# Patient Record
Sex: FEMALE | Race: ASIAN | NOT HISPANIC OR LATINO | URBAN - METROPOLITAN AREA
[De-identification: names, ages, dates, MRNs, and addresses within clinical notes are randomized per-mention and may not be internally consistent; named-entity substitution may affect disease eponyms.]

---

## 2022-06-17 ENCOUNTER — TELEPHONE (OUTPATIENT)
Dept: SCHEDULING | Facility: IMAGING CENTER | Age: 67
End: 2022-06-17

## 2022-06-21 ENCOUNTER — OFFICE VISIT (OUTPATIENT)
Dept: MEDICAL GROUP | Facility: PHYSICIAN GROUP | Age: 67
End: 2022-06-21
Payer: MEDICARE

## 2022-06-21 VITALS
BODY MASS INDEX: 23.92 KG/M2 | WEIGHT: 148.81 LBS | RESPIRATION RATE: 14 BRPM | DIASTOLIC BLOOD PRESSURE: 72 MMHG | HEART RATE: 96 BPM | HEIGHT: 66 IN | SYSTOLIC BLOOD PRESSURE: 132 MMHG | OXYGEN SATURATION: 94 % | TEMPERATURE: 97.7 F

## 2022-06-21 DIAGNOSIS — R41.844 FRONTAL LOBE AND EXECUTIVE FUNCTION DEFICIT: ICD-10-CM

## 2022-06-21 DIAGNOSIS — E11.9 TYPE 2 DIABETES MELLITUS WITHOUT COMPLICATION, WITHOUT LONG-TERM CURRENT USE OF INSULIN (HCC): ICD-10-CM

## 2022-06-21 DIAGNOSIS — Z00.00 HEALTHCARE MAINTENANCE: ICD-10-CM

## 2022-06-21 DIAGNOSIS — G31.01 PRIMARY PROGRESSIVE APHASIA (HCC): ICD-10-CM

## 2022-06-21 DIAGNOSIS — F02.80 PRIMARY PROGRESSIVE APHASIA (HCC): ICD-10-CM

## 2022-06-21 PROCEDURE — 99203 OFFICE O/P NEW LOW 30 MIN: CPT | Performed by: STUDENT IN AN ORGANIZED HEALTH CARE EDUCATION/TRAINING PROGRAM

## 2022-06-21 RX ORDER — ATORVASTATIN CALCIUM 80 MG/1
80 TABLET, FILM COATED ORAL EVERY EVENING
Qty: 90 TABLET | Refills: 1 | Status: SHIPPED | OUTPATIENT
Start: 2022-06-21 | End: 2022-08-24 | Stop reason: SDUPTHER

## 2022-06-21 RX ORDER — LORATADINE 10 MG/1
10 TABLET ORAL DAILY
Qty: 90 TABLET | Refills: 1 | Status: SHIPPED | OUTPATIENT
Start: 2022-06-21 | End: 2022-09-27 | Stop reason: SDUPTHER

## 2022-06-21 RX ORDER — LISINOPRIL 40 MG/1
40 TABLET ORAL DAILY
Qty: 90 TABLET | Refills: 1 | Status: SHIPPED | OUTPATIENT
Start: 2022-06-21 | End: 2022-08-24 | Stop reason: SDUPTHER

## 2022-06-21 RX ORDER — MONTELUKAST SODIUM 10 MG/1
10 TABLET ORAL DAILY
Qty: 90 TABLET | Refills: 0 | Status: SHIPPED | OUTPATIENT
Start: 2022-06-21 | End: 2022-06-28

## 2022-06-21 RX ORDER — LISINOPRIL 40 MG/1
40 TABLET ORAL DAILY
COMMUNITY
End: 2022-06-21 | Stop reason: SDUPTHER

## 2022-06-21 RX ORDER — ATORVASTATIN CALCIUM 80 MG/1
80 TABLET, FILM COATED ORAL NIGHTLY
COMMUNITY
End: 2022-06-21 | Stop reason: SDUPTHER

## 2022-06-21 RX ORDER — OXYBUTYNIN CHLORIDE 5 MG/1
5 TABLET ORAL 3 TIMES DAILY
COMMUNITY
End: 2022-06-21 | Stop reason: SDUPTHER

## 2022-06-21 RX ORDER — PIOGLITAZONEHYDROCHLORIDE 30 MG/1
30 TABLET ORAL DAILY
COMMUNITY
End: 2022-06-21

## 2022-06-21 RX ORDER — PANTOPRAZOLE SODIUM 40 MG/1
40 TABLET, DELAYED RELEASE ORAL DAILY
Qty: 90 TABLET | Refills: 1 | Status: SHIPPED | OUTPATIENT
Start: 2022-06-21 | End: 2022-08-24 | Stop reason: SDUPTHER

## 2022-06-21 RX ORDER — MONTELUKAST SODIUM 10 MG/1
10 TABLET ORAL DAILY
COMMUNITY
End: 2022-06-21 | Stop reason: SDUPTHER

## 2022-06-21 RX ORDER — PANTOPRAZOLE SODIUM 40 MG/1
40 TABLET, DELAYED RELEASE ORAL DAILY
COMMUNITY
End: 2022-06-21 | Stop reason: SDUPTHER

## 2022-06-21 RX ORDER — LORATADINE 10 MG/1
10 TABLET ORAL DAILY
COMMUNITY
End: 2022-06-21 | Stop reason: SDUPTHER

## 2022-06-21 RX ORDER — OXYBUTYNIN CHLORIDE 5 MG/1
5 TABLET ORAL 3 TIMES DAILY
Qty: 120 TABLET | Refills: 2 | Status: SHIPPED | OUTPATIENT
Start: 2022-06-21 | End: 2022-08-24 | Stop reason: SDUPTHER

## 2022-06-21 NOTE — ASSESSMENT & PLAN NOTE
Patient has primary progressive aphasia, was seeing a neurologist team in [facility].  I am requesting records from her previous primary care provider.  As far as treatment there is limited options per the  however I think it would be a good idea to have neurology follow-up at least once a year to help manage complications as her disease progresses.

## 2022-06-21 NOTE — PROGRESS NOTES
HISTORY OF PRESENT ILLNESS: Roxane is a pleasant 67 y.o. female, established patient who presents today to discuss medical problems as listed below:    History was difficult to establish, patient is nonverbal most of it taken through the  and with using patient's device.    Problem   Type 2 Diabetes Mellitus Without Complication, Without Long-Term Current Use of Insulin (Hcc)    Reaction to metformin due to loss of appetite and sense of taste. She was put on actos but this is making her urinate.      Frontal Lobe and Executive Function Deficit    Patient nonverbal. She was fine until a few years. Primary progressive aphasia. Rare condition, was seeing [facility] for specialist. Reportedly nothing to do for this disease.      Healthcare Maintenance   Primary Progressive Aphasia (Hcc)        Current Outpatient Medications Ordered in Epic   Medication Sig Dispense Refill   • atorvastatin (LIPITOR) 80 MG tablet Take 1 Tablet by mouth every evening for 90 days. 90 Tablet 1   • pantoprazole (PROTONIX) 40 MG Tablet Delayed Response Take 1 Tablet by mouth every day for 90 days. 90 Tablet 1   • lisinopril (PRINIVIL) 40 MG tablet Take 1 Tablet by mouth every day for 90 days. 90 Tablet 1   • oxybutynin (DITROPAN) 5 MG Tab Take 1 Tablet by mouth 3 times a day. 120 Tablet 2   • montelukast (SINGULAIR) 10 MG Tab Take 1 Tablet by mouth every day. 90 Tablet 0   • loratadine (CLARITIN) 10 MG Tab Take 1 Tablet by mouth every day. 90 Tablet 1     No current Epic-ordered facility-administered medications on file.       History reviewed. No pertinent past medical history.  History reviewed. No pertinent surgical history.  Social History     Tobacco Use   • Smoking status: Former Smoker     Quit date: 2009     Years since quittin.0   • Smokeless tobacco: Never Used   Vaping Use   • Vaping Use: Never used   Substance Use Topics   • Alcohol use: Never   • Drug use: Never      History reviewed. No pertinent family  "history.  Current Outpatient Medications   Medication Sig Dispense Refill   • atorvastatin (LIPITOR) 80 MG tablet Take 1 Tablet by mouth every evening for 90 days. 90 Tablet 1   • pantoprazole (PROTONIX) 40 MG Tablet Delayed Response Take 1 Tablet by mouth every day for 90 days. 90 Tablet 1   • lisinopril (PRINIVIL) 40 MG tablet Take 1 Tablet by mouth every day for 90 days. 90 Tablet 1   • oxybutynin (DITROPAN) 5 MG Tab Take 1 Tablet by mouth 3 times a day. 120 Tablet 2   • montelukast (SINGULAIR) 10 MG Tab Take 1 Tablet by mouth every day. 90 Tablet 0   • loratadine (CLARITIN) 10 MG Tab Take 1 Tablet by mouth every day. 90 Tablet 1     No current facility-administered medications for this visit.       Allergies:  Not on File    Allergies, past medical history, past surgical history, family history, social history reviewed and updated.    Objective:    /72   Pulse 96   Temp 36.5 °C (97.7 °F) (Temporal)   Resp 14   Ht 1.676 m (5' 6\")   Wt 67.5 kg (148 lb 13 oz)   SpO2 94%   BMI 24.02 kg/m²    Body mass index is 24.02 kg/m².    Physical Exam  Vitals reviewed.   Constitutional:       General: She is not in acute distress.     Appearance: Normal appearance. She is not ill-appearing or toxic-appearing.   HENT:      Head: Normocephalic and atraumatic.   Eyes:      General: No scleral icterus.        Right eye: No discharge.         Left eye: No discharge.      Conjunctiva/sclera: Conjunctivae normal.   Neck:      Vascular: No carotid bruit.   Cardiovascular:      Rate and Rhythm: Normal rate and regular rhythm.      Pulses: Normal pulses.      Heart sounds: Normal heart sounds. No murmur heard.  Pulmonary:      Effort: Pulmonary effort is normal. No respiratory distress.      Breath sounds: Normal breath sounds. No wheezing or rales.   Abdominal:      General: Abdomen is flat. Bowel sounds are normal. There is no distension.      Palpations: Abdomen is soft. There is no mass.      Tenderness: There is no " abdominal tenderness. There is no guarding or rebound.   Musculoskeletal:      Cervical back: Neck supple.      Right lower leg: No edema.      Left lower leg: No edema.   Skin:     General: Skin is warm and dry.   Neurological:      General: No focal deficit present.      Mental Status: She is alert.   Psychiatric:         Mood and Affect: Mood normal.         Thought Content: Thought content normal.          Assessment/Plan:    Problem List Items Addressed This Visit     Type 2 diabetes mellitus without complication, without long-term current use of insulin (HCC)     Patient reports that she is not tolerant to metformin and is having a side effect with of Actos of increased urination.  Do not believe this is a common side effect of this medication.    Plan: Discontinue medications for now, will request recent lab work which was done a few weeks ago.  She will also write down her blood sugars daily and return to care in 1 week where we can finish establishing care and decide on further diabetic management.           Frontal lobe and executive function deficit    Relevant Orders    Referral to Neurology    Healthcare maintenance     Breast cancer: no known fam hx of breast or ovarian cancer but little to no relationship with family.   Mammograms: have been normal, last one was within last year    Colonoscopy:  Will request records from primary care office.    Pap smear:  Will request records    Immunizations: had both covid vaccinations, no booster.           Primary progressive aphasia (HCC)     Patient has primary progressive aphasia, was seeing a neurologist team in [facility].  I am requesting records from her previous primary care provider.  As far as treatment there is limited options per the  however I think it would be a good idea to have neurology follow-up at least once a year to help manage complications as her disease progresses.           Relevant Orders    Referral to Neurology           Return in  about 10 days (around 7/1/2022).

## 2022-06-21 NOTE — ASSESSMENT & PLAN NOTE
Breast cancer: no known fam hx of breast or ovarian cancer but little to no relationship with family.   Mammograms: have been normal, last one was within last year    Colonoscopy:  Will request records from primary care office.    Pap smear:  Will request records    Immunizations: had both covid vaccinations, no booster.

## 2022-06-21 NOTE — LETTER
[employer]  Mili Fair D.O.  [address]  [place] [address]  Fax: [phone]   Authorization for Release/Disclosure of   Protected Health Information   Name: GIANNA BORJA : 1955 SSN: [id number]   Address: [address] [place] [address] [address] [place] [address] Phone:    There are no phone numbers on file.   I authorize the entity listed below to release/disclose the PHI below to:   [facility]/Mili Fair D.O. and Mili Fair D.O.   Provider or Entity Name:  Dr. Rupesh Mckee, [place]  Fx [phone]     Address   City, State, [facility]   Phone:      Fax:     Reason for request: continuity of care   Information to be released:    [x  ] LAST COLONOSCOPY,  including any PATH REPORT and follow-up  [  ] LAST FIT/COLOGUARD RESULT [ x ] LAST DEXA  [ x ] LAST MAMMOGRAM  [x  ] LAST PAP  [x  ] LAST LABS [  ] RETINA EXAM REPORT  [x  ] IMMUNIZATION RECORDS  [ x ] Release all info      [  ] Check here and initial the line next to each item to release ALL health information INCLUDING  _____ Care and treatment for drug and / or alcohol abuse  _____ HIV testing, infection status, or AIDS  _____ Genetic Testing    DATES OF SERVICE OR TIME PERIOD TO BE DISCLOSED: _____________  I understand and acknowledge that:  * This Authorization may be revoked at any time by you in writing, except if your health information has already been used or disclosed.  * Your health information that will be used or disclosed as a result of you signing this authorization could be re-disclosed by the recipient. If this occurs, your re-disclosed health information may no longer be protected by State or Federal laws.  * You may refuse to sign this Authorization. Your refusal will not affect your ability to obtain treatment.  * This Authorization becomes effective upon signing and will  on (date) __________.      If no date is indicated, this Authorization will  one (1) year from the signature date.    Name: Gianna  Álvaro    Signature:   Date:     6/21/2022       PLEASE FAX REQUESTED RECORDS BACK TO: [phone]

## 2022-06-21 NOTE — ASSESSMENT & PLAN NOTE
Patient reports that she is not tolerant to metformin and is having a side effect with of Actos of increased urination.  Do not believe this is a common side effect of this medication.    Plan: Discontinue medications for now, will request recent lab work which was done a few weeks ago.  She will also write down her blood sugars daily and return to care in 1 week where we can finish establishing care and decide on further diabetic management.

## 2022-06-28 ENCOUNTER — OFFICE VISIT (OUTPATIENT)
Dept: MEDICAL GROUP | Facility: PHYSICIAN GROUP | Age: 67
End: 2022-06-28
Payer: MEDICARE

## 2022-06-28 VITALS
WEIGHT: 149.69 LBS | HEART RATE: 98 BPM | BODY MASS INDEX: 24.06 KG/M2 | SYSTOLIC BLOOD PRESSURE: 130 MMHG | HEIGHT: 66 IN | DIASTOLIC BLOOD PRESSURE: 86 MMHG | TEMPERATURE: 98.9 F | RESPIRATION RATE: 14 BRPM | OXYGEN SATURATION: 94 %

## 2022-06-28 DIAGNOSIS — Z00.00 HEALTHCARE MAINTENANCE: ICD-10-CM

## 2022-06-28 DIAGNOSIS — Z23 NEED FOR VACCINATION: ICD-10-CM

## 2022-06-28 DIAGNOSIS — N32.81 OVERACTIVE BLADDER: ICD-10-CM

## 2022-06-28 DIAGNOSIS — E11.9 TYPE 2 DIABETES MELLITUS WITHOUT COMPLICATION, WITHOUT LONG-TERM CURRENT USE OF INSULIN (HCC): ICD-10-CM

## 2022-06-28 LAB
HBA1C MFR BLD: 6.3 % (ref 0–5.6)
INT CON NEG: NEGATIVE
INT CON POS: POSITIVE

## 2022-06-28 PROCEDURE — 83036 HEMOGLOBIN GLYCOSYLATED A1C: CPT | Performed by: STUDENT IN AN ORGANIZED HEALTH CARE EDUCATION/TRAINING PROGRAM

## 2022-06-28 PROCEDURE — 99213 OFFICE O/P EST LOW 20 MIN: CPT | Mod: 25 | Performed by: STUDENT IN AN ORGANIZED HEALTH CARE EDUCATION/TRAINING PROGRAM

## 2022-06-28 PROCEDURE — 90677 PCV20 VACCINE IM: CPT | Performed by: STUDENT IN AN ORGANIZED HEALTH CARE EDUCATION/TRAINING PROGRAM

## 2022-06-28 PROCEDURE — G0009 ADMIN PNEUMOCOCCAL VACCINE: HCPCS | Performed by: STUDENT IN AN ORGANIZED HEALTH CARE EDUCATION/TRAINING PROGRAM

## 2022-06-28 NOTE — PROGRESS NOTES
HISTORY OF PRESENT ILLNESS: Roxane is a pleasant 67 y.o. female, established patient who presents today to discuss medical problems as listed below:    Problem   Overactive Bladder    Takes oxybutynin     Type 2 Diabetes Mellitus Without Complication, Without Long-Term Current Use of Insulin (Hcc)    Patient is here to finish up following up establishing care.  She has an progressive aphasia disorder that makes history taking very difficult and time-consuming.  She reports that she has had reactions from metformin, Jardiance, Actos and would not like to be on these medications anymore for her diabetes.  She has been checking her sugars and they were running about 150s a.m. fasted.  Denies any chest pain, dyspnea exertion, nausea, vomiting, abdominal pain, edema.     Healthcare Maintenance        Current Outpatient Medications Ordered in Epic   Medication Sig Dispense Refill   • SITagliptin (JANUVIA) 100 MG Tab Take 1 Tablet by mouth every day. 30 Tablet 3   • atorvastatin (LIPITOR) 80 MG tablet Take 1 Tablet by mouth every evening for 90 days. 90 Tablet 1   • pantoprazole (PROTONIX) 40 MG Tablet Delayed Response Take 1 Tablet by mouth every day for 90 days. 90 Tablet 1   • lisinopril (PRINIVIL) 40 MG tablet Take 1 Tablet by mouth every day for 90 days. 90 Tablet 1   • oxybutynin (DITROPAN) 5 MG Tab Take 1 Tablet by mouth 3 times a day. 120 Tablet 2   • loratadine (CLARITIN) 10 MG Tab Take 1 Tablet by mouth every day. 90 Tablet 1     No current Epic-ordered facility-administered medications on file.       ROS     No past medical history on file.  No past surgical history on file.  Social History     Tobacco Use   • Smoking status: Former Smoker     Quit date: 2009     Years since quittin.0   • Smokeless tobacco: Never Used   Vaping Use   • Vaping Use: Never used   Substance Use Topics   • Alcohol use: Never   • Drug use: Never      No family history on file.  Current Outpatient Medications   Medication  "Sig Dispense Refill   • SITagliptin (JANUVIA) 100 MG Tab Take 1 Tablet by mouth every day. 30 Tablet 3   • atorvastatin (LIPITOR) 80 MG tablet Take 1 Tablet by mouth every evening for 90 days. 90 Tablet 1   • pantoprazole (PROTONIX) 40 MG Tablet Delayed Response Take 1 Tablet by mouth every day for 90 days. 90 Tablet 1   • lisinopril (PRINIVIL) 40 MG tablet Take 1 Tablet by mouth every day for 90 days. 90 Tablet 1   • oxybutynin (DITROPAN) 5 MG Tab Take 1 Tablet by mouth 3 times a day. 120 Tablet 2   • loratadine (CLARITIN) 10 MG Tab Take 1 Tablet by mouth every day. 90 Tablet 1     No current facility-administered medications for this visit.       Allergies:  No Known Allergies    Allergies, past medical history, past surgical history, family history, social history reviewed and updated.    Objective:    /86   Pulse 98   Temp 37.2 °C (98.9 °F) (Temporal)   Resp 14   Ht 1.676 m (5' 6\")   Wt 67.9 kg (149 lb 11.1 oz)   SpO2 94%   BMI 24.16 kg/m²    Body mass index is 24.16 kg/m².    Physical Exam  Vitals reviewed.   Constitutional:       General: She is not in acute distress.     Appearance: Normal appearance. She is not ill-appearing or toxic-appearing.   HENT:      Head: Normocephalic and atraumatic.   Eyes:      General: No scleral icterus.        Right eye: No discharge.         Left eye: No discharge.      Conjunctiva/sclera: Conjunctivae normal.   Neck:      Vascular: No carotid bruit.   Cardiovascular:      Rate and Rhythm: Normal rate and regular rhythm.      Pulses: Normal pulses.      Heart sounds: Normal heart sounds. No murmur heard.  Pulmonary:      Effort: Pulmonary effort is normal. No respiratory distress.      Breath sounds: Normal breath sounds. No wheezing or rales.   Abdominal:      General: Abdomen is flat. Bowel sounds are normal. There is no distension.      Palpations: Abdomen is soft. There is no mass.      Tenderness: There is no abdominal tenderness. There is no guarding or " rebound.   Musculoskeletal:      Cervical back: Neck supple.      Right lower leg: No edema.      Left lower leg: No edema.   Skin:     General: Skin is warm and dry.   Neurological:      General: No focal deficit present.      Mental Status: She is alert.   Psychiatric:         Mood and Affect: Mood normal.         Thought Content: Thought content normal.          Assessment/Plan:    Problem List Items Addressed This Visit     Type 2 diabetes mellitus without complication, without long-term current use of insulin (HCC)     Patient is not tolerant to most of diabetic medications.  She has tried metformin, Actos, Jardiance in the past.  We will start Januvia 100 mg daily.  A1c today 6.3, chronic condition, well-controlled.  Follow-up 1 month to see how she is tolerating this medication as well as diabetic foot exam.  Pneumonia vaccine today.           Relevant Medications    SITagliptin (JANUVIA) 100 MG Tab    Other Relevant Orders    POCT  A1C (Completed)    Lipid Profile    Microalbumin Creat Ratio Urine (Lab Collect)    Comp Metabolic Panel    CBC WITHOUT DIFFERENTIAL    Healthcare maintenance     Breast cancer: no known fam hx of breast or ovarian cancer but little to no relationship with family.   Mammograms: have been normal, last one was within last year, patient reports she would not like to get another one done this year.     Colonoscopy:  Will request records from primary care office.     Pap smear:  Will request records     Immunizations: had both covid vaccinations, no booster.               Overactive bladder      Other Visit Diagnoses     Need for vaccination        Relevant Orders    Pneumococcal Conjugate Vaccine 20-Valent (19 yrs+) (Completed)      1 chronic condition, well-controlled with medication management.    Return in about 4 weeks (around 7/26/2022) for diabetes.

## 2022-06-28 NOTE — ASSESSMENT & PLAN NOTE
Breast cancer: no known fam hx of breast or ovarian cancer but little to no relationship with family.   Mammograms: have been normal, last one was within last year, patient reports she would not like to get another one done this year.     Colonoscopy:  Will request records from primary care office.     Pap smear:  Will request records     Immunizations: had both covid vaccinations, no booster.

## 2022-06-28 NOTE — ASSESSMENT & PLAN NOTE
Patient is not tolerant to most of diabetic medications.  She has tried metformin, Actos, Jardiance in the past.  We will start Januvia 100 mg daily.  A1c today 6.3, chronic condition, well-controlled.  Follow-up 1 month to see how she is tolerating this medication as well as diabetic foot exam.  Pneumonia vaccine today.

## 2022-07-28 ENCOUNTER — TELEPHONE (OUTPATIENT)
Dept: HEALTH INFORMATION MANAGEMENT | Facility: OTHER | Age: 67
End: 2022-07-28
Payer: MEDICARE

## 2022-07-28 NOTE — TELEPHONE ENCOUNTER
Outcome: Talked to  since patient can't speak, they would not like to r/s at the moment.    Please transfer to Patient Outreach Team at [phone] when patient returns call.    Attempt # 1    -CS

## 2022-08-24 ENCOUNTER — OFFICE VISIT (OUTPATIENT)
Dept: INTERNAL MEDICINE | Facility: OTHER | Age: 67
End: 2022-08-24
Payer: MEDICARE

## 2022-08-24 VITALS
DIASTOLIC BLOOD PRESSURE: 88 MMHG | HEIGHT: 66 IN | SYSTOLIC BLOOD PRESSURE: 131 MMHG | WEIGHT: 143.2 LBS | OXYGEN SATURATION: 96 % | TEMPERATURE: 97.7 F | HEART RATE: 90 BPM | BODY MASS INDEX: 23.01 KG/M2

## 2022-08-24 DIAGNOSIS — G31.01 PRIMARY PROGRESSIVE APHASIA (HCC): ICD-10-CM

## 2022-08-24 DIAGNOSIS — N32.81 OVERACTIVE BLADDER: ICD-10-CM

## 2022-08-24 DIAGNOSIS — K21.9 GASTROESOPHAGEAL REFLUX DISEASE WITHOUT ESOPHAGITIS: ICD-10-CM

## 2022-08-24 DIAGNOSIS — F02.80 PRIMARY PROGRESSIVE APHASIA (HCC): ICD-10-CM

## 2022-08-24 DIAGNOSIS — J30.2 SEASONAL ALLERGIES: ICD-10-CM

## 2022-08-24 DIAGNOSIS — E11.9 TYPE 2 DIABETES MELLITUS WITHOUT COMPLICATION, WITHOUT LONG-TERM CURRENT USE OF INSULIN (HCC): ICD-10-CM

## 2022-08-24 DIAGNOSIS — R41.844 FRONTAL LOBE AND EXECUTIVE FUNCTION DEFICIT: ICD-10-CM

## 2022-08-24 PROCEDURE — 99204 OFFICE O/P NEW MOD 45 MIN: CPT | Mod: GC | Performed by: STUDENT IN AN ORGANIZED HEALTH CARE EDUCATION/TRAINING PROGRAM

## 2022-08-24 RX ORDER — OXYBUTYNIN CHLORIDE 5 MG/1
5 TABLET ORAL 3 TIMES DAILY
Qty: 90 TABLET | Refills: 2 | Status: SHIPPED | OUTPATIENT
Start: 2022-08-24 | End: 2022-09-27

## 2022-08-24 RX ORDER — PIOGLITAZONEHYDROCHLORIDE 30 MG/1
30 TABLET ORAL DAILY
COMMUNITY
End: 2022-08-24

## 2022-08-24 RX ORDER — ATORVASTATIN CALCIUM 80 MG/1
80 TABLET, FILM COATED ORAL EVERY EVENING
Qty: 90 TABLET | Refills: 1 | Status: SHIPPED | OUTPATIENT
Start: 2022-08-24 | End: 2022-09-27 | Stop reason: SDUPTHER

## 2022-08-24 RX ORDER — LISINOPRIL 40 MG/1
40 TABLET ORAL DAILY
Qty: 90 TABLET | Refills: 1 | Status: SHIPPED | OUTPATIENT
Start: 2022-08-24 | End: 2022-09-27 | Stop reason: SDUPTHER

## 2022-08-24 RX ORDER — FLUTICASONE PROPIONATE 50 MCG
1 SPRAY, SUSPENSION (ML) NASAL DAILY
Qty: 16 G | Refills: 1 | Status: SHIPPED | OUTPATIENT
Start: 2022-08-24 | End: 2022-09-27 | Stop reason: SDUPTHER

## 2022-08-24 RX ORDER — VITS A,C,E/LUTEIN/MINERALS 300MCG-200
1 TABLET ORAL DAILY
COMMUNITY

## 2022-08-24 RX ORDER — PANTOPRAZOLE SODIUM 40 MG/1
40 TABLET, DELAYED RELEASE ORAL DAILY
Qty: 90 TABLET | Refills: 1 | Status: SHIPPED | OUTPATIENT
Start: 2022-08-24 | End: 2022-09-27 | Stop reason: SDUPTHER

## 2022-08-24 RX ORDER — MULTIVIT WITH MINERALS/LUTEIN
TABLET ORAL
COMMUNITY

## 2022-08-24 ASSESSMENT — PATIENT HEALTH QUESTIONNAIRE - PHQ9: CLINICAL INTERPRETATION OF PHQ2 SCORE: 0

## 2022-08-24 NOTE — PROGRESS NOTES
New Patient to Establish    Reason to establish: New patient to establish    CC:   Chief Complaint   Patient presents with    New Patient    Epistaxis    Cough    Loss of Appetite    Medication Refill    Diabetes       HPI:     Patient is a pleasant 68 y/o F with a PMH significant for primary progressive aphasia, type 2 diabetes mellitus without use of insulin (A1c 6.3% in 6/2022), GERD, seasonal allergies, overactive bladder here for the issues listed below as well as to establish care.  She is here with her , Yovani Mccoy.  She moved from [place] to [place] on June 12, 2022.  History is provided by both the patient and her .    Note: I have independently reviewed all records that the patient has brought from [facility].    Epistaxis: She has been having bloody nose in the morning and clear mucus.  It has been happening every day. They have been using a small humidifier for a few weeks. Patient has been coughing frequently in the AM and in the PM, dry cough. She states she cannot taste food and has a loss of appetite because of this.    Diabetes: Took metformin for several years, and she went to a diabetic specialist in [place] who was told that it was due to metformin. She was changed to pioglitazone and appetite came back gradually but now she has reduced appetite still.  She has also tried Jardiance and Januvia in the past but these have been stopped because of the side effects (she felt that they were too strong). A1c was 6.3% in 6/2022. She does not have any symptoms of dizziness or falls. Blood glucose levels are usually around 150. She eats a lot of vegetables though eats a lot of noodles and rice, as well as potstickers. She does not want to change her diet.    Urinary incontinence: She has been taking oxybutynin 5 mg for quite a long time which has stopped her urinary incontinence. No urinary frequency, urgency, no dysuria, though she has dry mouth as a side effect.    Primary  progressive aphasia: Started about 5 years ago. Thought at the beginning it was a hearing loss; however, she has gotten extensive workup at [facility].  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 There is no cure for this though she is taking many chinese herbs to try to help this. She can hear things though it sounds garbled (hears noise). She cannot speak. Hearing aids do not help (makes it worse). She can read Chinese and can write simplified Chinese; she uses this as well as a cell phone text  in order to communicate with her . She hears and enjoys music previously though now it is getting more difficitult. It is unknown whether in the future her comprehension will get worsen. She previously got a referral to [facility] but would like my opinion on who to go to. Her  needs a power of  due to issues with her neurological issues; per , patient will likely need a note/evaluation from a neurologist for further evaluation of this.    She needs refills of all of her medications.    Patient Active Problem List    Diagnosis Date Noted    Gastroesophageal reflux disease without esophagitis 08/24/2022    Overactive bladder 06/28/2022    Type 2 diabetes mellitus without complication, without long-term current use of insulin (HCC) 06/21/2022    Frontal lobe and executive function deficit 06/21/2022    Healthcare maintenance 06/21/2022    Primary progressive aphasia (HCC) 06/21/2022       History reviewed. No pertinent past medical history.    Current Outpatient Medications   Medication Sig Dispense Refill    Ascorbic Acid (VITAMIN C) 1000 MG Tab Take  by mouth.      Multiple Vitamins-Minerals (OCUVITE-LUTEIN) Tab Take 1 Tablet by mouth every day.      fluticasone (FLONASE) 50 MCG/ACT nasal spray Administer 1 Spray into affected nostril(S) every day. 16 g 1    atorvastatin (LIPITOR) 80 MG tablet Take 1 Tablet by mouth every evening for 180 days. 90 Tablet 1    lisinopril  "(PRINIVIL) 40 MG tablet Take 1 Tablet by mouth every day for 180 days. 90 Tablet 1    pantoprazole (PROTONIX) 40 MG Tablet Delayed Response Take 1 Tablet by mouth every day for 180 days. 90 Tablet 1    oxybutynin (DITROPAN) 5 MG Tab Take 1 Tablet by mouth 3 times a day. 90 Tablet 2    loratadine (CLARITIN) 10 MG Tab Take 1 Tablet by mouth every day. 90 Tablet 1     No current facility-administered medications for this visit.       Allergies as of 2022    (No Known Allergies)       Social History     Socioeconomic History    Marital status:      Spouse name: Not on file    Number of children: Not on file    Years of education: Not on file    Highest education level: Not on file   Occupational History    Not on file   Tobacco Use    Smoking status: Former     Types: Cigarettes     Quit date: 2009     Years since quittin.2    Smokeless tobacco: Never   Vaping Use    Vaping Use: Never used   Substance and Sexual Activity    Alcohol use: Never    Drug use: Never    Sexual activity: Not on file   Other Topics Concern    Not on file   Social History Narrative    Not on file     Social Determinants of Health     Financial Resource Strain: Not on file   Food Insecurity: Not on file   Transportation Needs: Not on file   Physical Activity: Not on file   Stress: Not on file   Social Connections: Not on file   Intimate Partner Violence: Not on file   Housing Stability: Not on file       History reviewed. No pertinent family history.    History reviewed. No pertinent surgical history.    ROS See HPI      /88 (BP Location: Left arm, Patient Position: Sitting, BP Cuff Size: Adult)   Pulse 90   Temp 36.5 °C (97.7 °F) (Temporal)   Ht 1.676 m (5' 6\")   Wt 65 kg (143 lb 3.2 oz)   SpO2 96%   BMI 23.11 kg/m²     Physical Exam    Physical Exam  Vitals and nursing note reviewed. Exam conducted with a chaperone present (, Yovani dao, present).   Constitutional:       General: She is not in acute " distress.     Appearance: Normal appearance. She is normal weight. She is not ill-appearing.   HENT:      Head: Normocephalic and atraumatic.      Right Ear: External ear normal.      Left Ear: External ear normal.      Nose: Nose normal. No congestion or rhinorrhea.      Mouth/Throat:      Mouth: Mucous membranes are moist.      Pharynx: Oropharynx is clear. No oropharyngeal exudate.   Eyes:      General:         Right eye: No discharge.         Left eye: No discharge.      Extraocular Movements: Extraocular movements intact.      Conjunctiva/sclera: Conjunctivae normal.      Pupils: Pupils are equal, round, and reactive to light.   Cardiovascular:      Rate and Rhythm: Normal rate and regular rhythm.      Pulses: Normal pulses.   Pulmonary:      Effort: Pulmonary effort is normal. No respiratory distress.      Breath sounds: Normal breath sounds. No wheezing or rales.   Abdominal:      General: Abdomen is flat. Bowel sounds are normal. There is no distension.      Palpations: Abdomen is soft.      Tenderness: There is no abdominal tenderness. There is no right CVA tenderness or left CVA tenderness.   Musculoskeletal:         General: No swelling or tenderness. Normal range of motion.      Cervical back: Normal range of motion and neck supple. No rigidity or tenderness.      Right lower leg: No edema.      Left lower leg: No edema.   Skin:     General: Skin is warm and dry.      Capillary Refill: Capillary refill takes less than 2 seconds.      Findings: No bruising.   Neurological:      Mental Status: She is alert and oriented to person, place, and time.      Cranial Nerves: No cranial nerve deficit or facial asymmetry.      Sensory: No sensory deficit.      Motor: Motor function is intact. No weakness or tremor.      Coordination: Coordination normal.      Deep Tendon Reflexes:      Reflex Scores:       Bicep reflexes are 1+ on the right side and 1+ on the left side.       Brachioradialis reflexes are 1+ on the  right side and 1+ on the left side.       Patellar reflexes are 1+ on the right side and 1+ on the left side.     Comments: Nonverbal   Psychiatric:         Mood and Affect: Mood normal.         Behavior: Behavior normal.         Thought Content: Thought content normal.         Judgment: Judgment normal.      Comments: Difficult to assess mood due to nonverbal status. However, through communication with writing, patient has normal mood and thought content, as well as judgment and behavior. She expressed understanding of plans via writing.       Note: I have reviewed all pertinent labs and diagnostic tests associated with this visit with specific comments listed under the assessment and plan below    Assessment and Plan    1. Type 2 diabetes mellitus without complication, without long-term current use of insulin ([facility])  Her A1c was 6.3% in June 2022. This is well controlled.  The patient was previously on pioglitazone; however, this has caused her to have low appetite.  We are going to stop her diabetes medication and watch her diet.  We will repeat her A1c next month.  Also ordering labs (CMP, lipid profile, urine microalbumin) for routine surveillance.  Refilling atorvastatin and lisinopril.    During next visit, will do diabetic monofilament exam.    I have encouraged the patient to continue checking her blood glucose levels.  - Comp Metabolic Panel; Future  - Lipid Profile; Future  - Microalbumin Creat Ratio Urine - Lab Collect; Future  - atorvastatin (LIPITOR) 80 MG tablet; Take 1 Tablet by mouth every evening for 180 days.  Dispense: 90 Tablet; Refill: 1  - lisinopril (PRINIVIL) 40 MG tablet; Take 1 Tablet by mouth every day for 180 days.  Dispense: 90 Tablet; Refill: 1    2. Primary progressive aphasia (HCC)  Patient wishes for a particular doctor to be referred to.  I have independently reviewed all records, including the full neurological work-up, for the patient from [facility].  [facility].  Patient needs DPOA paperwork, per , due to her progressive mental condition/neurological condition.  We will follow-up with this.  Patient is nonverbal and cannot hear/comprehend things when spoken to; however, she is able to communicate via writing and reading.  - Referral to Neurology    3. Frontal lobe and executive function deficit  See issue of primary progressive aphasia.  - Referral to Neurology    4. Gastroesophageal reflux disease without esophagitis  This has been a chronic issue.  Continue/refilled PPI, we will further address during future visit.  - pantoprazole (PROTONIX) 40 MG Tablet Delayed Response; Take 1 Tablet by mouth every day for 180 days.  Dispense: 90 Tablet; Refill: 1    5. Seasonal allergies  With epistaxis. Patient has also had nosebleeds ever since coming to a dry climate from the [facility].  I have instructed the patient and her  to get a humidifier and to use it regularly.  Instructed her to continue using Claritin and use of Flonase as well.  If needed, will suggest a nasal saline rinse/NeilMed sinus rinse.  Continue to monitor.    - fluticasone (FLONASE) 50 MCG/ACT nasal spray; Administer 1 Spray into affected nostril(S) every day.  Dispense: 16 g; Refill: 1    6. Overactive bladder  This has been a chronic issue.  We will continue to discuss during future visits.  - oxybutynin (DITROPAN) 5 MG Tab; Take 1 Tablet by mouth 3 times a day.  Dispense: 90 Tablet; Refill: 2      Followup: Return in about 4 weeks (around 9/21/2022) for Long.    We will discuss healthcare maintenance during next visit as well as diabetes.    Risk Assessment (discuss potential complications a function of chronic problems): moderate        Signed by: Andree Umaña M.D.

## 2022-08-24 NOTE — PATIENT INSTRUCTIONS
Hello!    Diabetes: stop your diabetes medication. We will monitor your diet.  -Please get all of your labwork done.  -For your nose: use a large humidifier at night. I am prescribing you 1 medication to spray in your nose.  -I am referring you to a neurologist I trust.    Come back for an appointment in 1 month.    Thank you!

## 2022-08-26 ENCOUNTER — TELEPHONE (OUTPATIENT)
Dept: INTERNAL MEDICINE | Facility: OTHER | Age: 67
End: 2022-08-26
Payer: MEDICARE

## 2022-08-26 NOTE — TELEPHONE ENCOUNTER
Spoke to  Darius and discussed lab results. Well controlled diabetes, repeat A1c in 3 months.  Darius thanked me for the call.    Andree Umaña MD, MPH  [employer] [facility], PGY-3

## 2022-08-26 NOTE — TELEPHONE ENCOUNTER
Called patient and  to discuss lab results - no answer, left voicemail. Will try to call again later.    Andree Umaña MD, MPH  [employer], PGY-3

## 2022-09-06 ENCOUNTER — TELEPHONE (OUTPATIENT)
Dept: INTERNAL MEDICINE | Facility: OTHER | Age: 67
End: 2022-09-06
Payer: MEDICARE

## 2022-09-06 DIAGNOSIS — E11.9 TYPE 2 DIABETES MELLITUS WITHOUT COMPLICATION, WITHOUT LONG-TERM CURRENT USE OF INSULIN (HCC): ICD-10-CM

## 2022-09-06 NOTE — TELEPHONE ENCOUNTER
Pt spouse called and lm requesting a refill on test strips, please review and send. I don't see test strips in her med list.

## 2022-09-07 NOTE — TELEPHONE ENCOUNTER
I called the patient's , Yovani Mccoy. Verified that the patient uses OneTouch Verio, blood sugars are generally around 140s. I also discussed labwork with the patient's ; will repeat in 6 months - 1 year. Will repeat A1c in 3 months. I am sending the prescription with refills to [facility] in [place]. Mr. Mccoy thanked me for the call.    Andree Umaña MD, MPH  [employer], PGY-3

## 2022-09-09 DIAGNOSIS — E11.9 TYPE 2 DIABETES MELLITUS WITHOUT COMPLICATION, WITHOUT LONG-TERM CURRENT USE OF INSULIN (HCC): ICD-10-CM

## 2022-09-15 ENCOUNTER — TELEPHONE (OUTPATIENT)
Dept: INTERNAL MEDICINE | Facility: OTHER | Age: 67
End: 2022-09-15
Payer: MEDICARE

## 2022-09-15 DIAGNOSIS — R05.9 COUGHING: ICD-10-CM
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
## 2022-09-18 PROBLEM — R05.9 COUGHING: Status: ACTIVE | Noted: 2022-09-18

## 2022-09-18 RX ORDER — BENZONATATE 100 MG/1
100 CAPSULE ORAL 2 TIMES DAILY PRN
Qty: 60 CAPSULE | Refills: 1 | Status: SHIPPED | OUTPATIENT
Start: 2022-09-18 | End: 2022-09-27 | Stop reason: SDUPTHER
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
## 2022-09-21 NOTE — TELEPHONE ENCOUNTER
I called the patient's , Mr. Mccoy. He told me that the patient is feeling better with the medication. I answered all of his questions and he expressed understanding of the plan.    Andree Umaña MD, MPH  [employer] [facility], PGY-3

## 2022-09-27 ENCOUNTER — OFFICE VISIT (OUTPATIENT)
Dept: INTERNAL MEDICINE | Facility: OTHER | Age: 67
End: 2022-09-27
Payer: MEDICARE

## 2022-09-27 DIAGNOSIS — Z78.0 POSTMENOPAUSAL: ICD-10-CM

## 2022-09-27 DIAGNOSIS — K64.4 RESIDUAL HEMORRHOIDAL SKIN TAGS: ICD-10-CM

## 2022-09-27 DIAGNOSIS — K21.9 GASTROESOPHAGEAL REFLUX DISEASE WITHOUT ESOPHAGITIS: ICD-10-CM

## 2022-09-27 DIAGNOSIS — Z23 NEED FOR INFLUENZA VACCINATION: ICD-10-CM

## 2022-09-27 DIAGNOSIS — R05.9 COUGHING: ICD-10-CM

## 2022-09-27 DIAGNOSIS — E11.9 TYPE 2 DIABETES MELLITUS WITHOUT COMPLICATION, WITHOUT LONG-TERM CURRENT USE OF INSULIN (HCC): ICD-10-CM

## 2022-09-27 DIAGNOSIS — J30.2 SEASONAL ALLERGIES: ICD-10-CM

## 2022-09-27 PROCEDURE — 99214 OFFICE O/P EST MOD 30 MIN: CPT | Mod: 25,GC | Performed by: STUDENT IN AN ORGANIZED HEALTH CARE EDUCATION/TRAINING PROGRAM

## 2022-09-27 PROCEDURE — 90662 IIV NO PRSV INCREASED AG IM: CPT | Performed by: STUDENT IN AN ORGANIZED HEALTH CARE EDUCATION/TRAINING PROGRAM

## 2022-09-27 PROCEDURE — G0008 ADMIN INFLUENZA VIRUS VAC: HCPCS | Performed by: STUDENT IN AN ORGANIZED HEALTH CARE EDUCATION/TRAINING PROGRAM

## 2022-09-27 RX ORDER — PANTOPRAZOLE SODIUM 40 MG/1
40 TABLET, DELAYED RELEASE ORAL DAILY
Qty: 90 TABLET | Refills: 1 | Status: SHIPPED | OUTPATIENT
Start: 2022-09-27 | End: 2022-12-20 | Stop reason: SDUPTHER

## 2022-09-27 RX ORDER — LORATADINE 10 MG/1
10 TABLET ORAL DAILY
Qty: 90 TABLET | Refills: 1 | Status: SHIPPED | OUTPATIENT
Start: 2022-09-27 | End: 2022-12-20

## 2022-09-27 RX ORDER — PIOGLITAZONEHYDROCHLORIDE 30 MG/1
30 TABLET ORAL DAILY
Qty: 90 TABLET | Refills: 1 | Status: SHIPPED | OUTPATIENT
Start: 2022-09-27 | End: 2022-12-20 | Stop reason: SDUPTHER

## 2022-09-27 RX ORDER — BENZONATATE 100 MG/1
100 CAPSULE ORAL 2 TIMES DAILY PRN
Qty: 60 CAPSULE | Refills: 1 | Status: SHIPPED | OUTPATIENT
Start: 2022-09-27 | End: 2022-12-20 | Stop reason: SDUPTHER

## 2022-09-27 RX ORDER — LISINOPRIL 40 MG/1
40 TABLET ORAL DAILY
Qty: 90 TABLET | Refills: 1 | Status: SHIPPED | OUTPATIENT
Start: 2022-09-27 | End: 2023-03-26

## 2022-09-27 RX ORDER — BLOOD SUGAR DIAGNOSTIC
STRIP MISCELLANEOUS
COMMUNITY
Start: 2022-09-09

## 2022-09-27 RX ORDER — ATORVASTATIN CALCIUM 80 MG/1
80 TABLET, FILM COATED ORAL EVERY EVENING
Qty: 90 TABLET | Refills: 1 | Status: SHIPPED | OUTPATIENT
Start: 2022-09-27 | End: 2022-12-20 | Stop reason: SDUPTHER

## 2022-09-27 RX ORDER — FLUTICASONE PROPIONATE 50 MCG
1 SPRAY, SUSPENSION (ML) NASAL DAILY
Qty: 16 G | Refills: 1 | Status: SHIPPED | OUTPATIENT
Start: 2022-09-27 | End: 2022-12-20 | Stop reason: SDUPTHER

## 2022-09-27 RX ORDER — HYDROCORTISONE ACETATE 25 MG/1
25 SUPPOSITORY RECTAL EVERY 12 HOURS
Qty: 100 SUPPOSITORY | Refills: 0 | Status: SHIPPED | OUTPATIENT
Start: 2022-09-27
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
## 2022-09-27 NOTE — PATIENT INSTRUCTIONS
Hello! Today I have seen you for:  -Gas: Please get Gas-x at [employer] for your gas.  -Refilled your diabetes medication.  -Gave you a medication for your hemorrhoids.  -Flu shot today.  -Ordered x-ray to look at your bones to make sure you don't have osteoporosis.  -Continue taking your cough medication.    See me in 2 months.    Thank you!

## 2022-10-18 ENCOUNTER — HOSPITAL ENCOUNTER (OUTPATIENT)
Dept: RADIOLOGY | Facility: MEDICAL CENTER | Age: 67
End: 2022-10-18
Attending: STUDENT IN AN ORGANIZED HEALTH CARE EDUCATION/TRAINING PROGRAM
Payer: MEDICARE

## 2022-10-18 DIAGNOSIS — Z78.0 POSTMENOPAUSAL: ICD-10-CM

## 2022-10-18 PROCEDURE — 77080 DXA BONE DENSITY AXIAL: CPT

## 2022-10-19 ENCOUNTER — TELEPHONE (OUTPATIENT)
Dept: INTERNAL MEDICINE | Facility: OTHER | Age: 67
End: 2022-10-19
Payer: MEDICARE

## 2022-10-19 NOTE — TELEPHONE ENCOUNTER
Please call the patient and let her and her  know that her bone density test was normal. Thanks!    Andree Umaña MD, MPH  [employer] [facility], PGY-3

## 2022-11-16 RX ORDER — AMLODIPINE BESYLATE 10 MG/1
10 TABLET ORAL DAILY
Qty: 90 TABLET | Refills: 1 | OUTPATIENT
Start: 2022-11-16

## 2022-11-16 RX ORDER — AMLODIPINE BESYLATE 10 MG/1
10 TABLET ORAL DAILY
COMMUNITY
End: 2022-12-20 | Stop reason: SDUPTHER

## 2022-11-16 NOTE — TELEPHONE ENCOUNTER
"I spoke to the patient's , Yovani Mccoy (patient is nonverbal and unable to communicate via telephone).    Mr. Mccoy stated that the last time the medication was prescribed was \"quite awhile ago\" when the patient was still living in [place]; her last amlodipine bottle was from a [facility] in [place]. I explained to the patient's  that since the patient's blood pressure is already well controlled on lisinopril, adding another blood pressure medication may cause more harm such as causing dizziness/falls/etc. I asked Mr. Mccoy to convey this message to the patient. Thus, will stop this medication and continue only on lisinopril for blood pressure management for the patient.    Mr. Mccoy thanked me for the call and stated that he would relay the message to the patient.    Andree Umaña MD, MPH  [facility], PGY-3    "

## 2022-12-20 ENCOUNTER — OFFICE VISIT (OUTPATIENT)
Dept: INTERNAL MEDICINE | Facility: OTHER | Age: 67
End: 2022-12-20
Payer: MEDICARE

## 2022-12-20 VITALS
OXYGEN SATURATION: 94 % | WEIGHT: 140 LBS | HEIGHT: 66 IN | BODY MASS INDEX: 22.5 KG/M2 | TEMPERATURE: 98.3 F | SYSTOLIC BLOOD PRESSURE: 137 MMHG | DIASTOLIC BLOOD PRESSURE: 86 MMHG | HEART RATE: 95 BPM

## 2022-12-20 DIAGNOSIS — E11.9 TYPE 2 DIABETES MELLITUS WITHOUT COMPLICATION, WITHOUT LONG-TERM CURRENT USE OF INSULIN (HCC): ICD-10-CM

## 2022-12-20 DIAGNOSIS — J06.9 VIRAL UPPER RESPIRATORY ILLNESS: ICD-10-CM

## 2022-12-20 DIAGNOSIS — K21.9 GASTROESOPHAGEAL REFLUX DISEASE WITHOUT ESOPHAGITIS: ICD-10-CM

## 2022-12-20 DIAGNOSIS — I10 PRIMARY HYPERTENSION: ICD-10-CM

## 2022-12-20 DIAGNOSIS — J30.2 SEASONAL ALLERGIES: ICD-10-CM

## 2022-12-20 LAB
HBA1C MFR BLD: 6.3 % (ref 0–5.6)
INT CON NEG: ABNORMAL
INT CON POS: ABNORMAL

## 2022-12-20 PROCEDURE — 99214 OFFICE O/P EST MOD 30 MIN: CPT | Mod: GC | Performed by: STUDENT IN AN ORGANIZED HEALTH CARE EDUCATION/TRAINING PROGRAM

## 2022-12-20 PROCEDURE — 83036 HEMOGLOBIN GLYCOSYLATED A1C: CPT | Performed by: STUDENT IN AN ORGANIZED HEALTH CARE EDUCATION/TRAINING PROGRAM

## 2022-12-20 RX ORDER — BENZONATATE 100 MG/1
100 CAPSULE ORAL 2 TIMES DAILY PRN
Qty: 60 CAPSULE | Refills: 1 | Status: SHIPPED | OUTPATIENT
Start: 2022-12-20

## 2022-12-20 RX ORDER — OXYBUTYNIN CHLORIDE 5 MG/1
5 TABLET ORAL 3 TIMES DAILY
COMMUNITY
Start: 2022-12-10

## 2022-12-20 RX ORDER — FLUTICASONE PROPIONATE 50 MCG
1 SPRAY, SUSPENSION (ML) NASAL DAILY
Qty: 16 G | Refills: 2 | Status: SHIPPED | OUTPATIENT
Start: 2022-12-20

## 2022-12-20 RX ORDER — PANTOPRAZOLE SODIUM 40 MG/1
40 TABLET, DELAYED RELEASE ORAL DAILY
Qty: 90 TABLET | Refills: 1 | Status: SHIPPED | OUTPATIENT
Start: 2022-12-20 | End: 2023-06-18

## 2022-12-20 RX ORDER — ATORVASTATIN CALCIUM 80 MG/1
80 TABLET, FILM COATED ORAL EVERY EVENING
Qty: 90 TABLET | Refills: 1 | Status: SHIPPED | OUTPATIENT
Start: 2022-12-20 | End: 2023-06-18

## 2022-12-20 RX ORDER — AMLODIPINE BESYLATE 10 MG/1
10 TABLET ORAL DAILY
Qty: 90 TABLET | Refills: 1 | Status: SHIPPED | OUTPATIENT
Start: 2022-12-20

## 2022-12-20 RX ORDER — PIOGLITAZONEHYDROCHLORIDE 30 MG/1
30 TABLET ORAL DAILY
Qty: 90 TABLET | Refills: 1 | Status: SHIPPED | OUTPATIENT
Start: 2022-12-20

## 2022-12-20 NOTE — PROGRESS NOTES
Established Patient    Roxane Rea is a 67 y.o. female who presents today with the following:    CC:   Chief Complaint   Patient presents with    Follow-Up     Diabetes          HPI:     Patient is a pleasant 67-year-old woman with a past medical history significant for primary progressive aphasia, type 2 diabetes mellitus (A1c 6.3% in 2022), GERD, and hemorrhoidal skin tags who presents for follow-up of the issues listed below.  She is here with her , Yovani Mccoy.     Diabetes: A1c 6.3% in . No hypoglycemia symptoms. She checks her BG levels daily and they are in the 140s to 150s. Taking pioglitazone regularly. Wishes for refill of this medication.    Patient has been recently having runny nose, and occasionally sneezing with a recent viral cold with sniffling, watery eyes, and occasional cough. She wishes to get refills of her benzontate. She has beeen taking Allegra and Mucinex which help too.    She is going to [place] tomorrow to visit family for the holidays. She would like refills of her meds.    ROS See HPI    Patient Active Problem List    Diagnosis Date Noted    Residual hemorrhoidal skin tags 2022    Postmenopausal 2022    Coughing 2022    Gastroesophageal reflux disease without esophagitis 2022    Overactive bladder 2022    Type 2 diabetes mellitus without complication, without long-term current use of insulin (HCC) 2022    Frontal lobe and executive function deficit 2022    Healthcare maintenance 2022    Primary progressive aphasia (HCC) 2022       Social History     Tobacco Use    Smoking status: Former     Types: Cigarettes     Quit date: 2009     Years since quittin.5    Smokeless tobacco: Never   Vaping Use    Vaping Use: Never used   Substance Use Topics    Alcohol use: Never    Drug use: Never       Current Outpatient Medications   Medication Sig Dispense Refill    amLODIPine (NORVASC) 10 MG Tab Take 1 Tablet by  "mouth every day.      ONETOUCH VERIO strip USE 1 STRIP TO CHECK GLUCOSE TWICE DAILY AND AS NEEDED      hydrocortisone (ANUSOL-HC) 25 MG Suppos Insert 1 Suppository into the rectum every 12 hours. 100 Suppository 0    atorvastatin (LIPITOR) 80 MG tablet Take 1 Tablet by mouth every evening for 180 days. 90 Tablet 1    benzonatate (TESSALON) 100 MG Cap Take 1 Capsule by mouth 2 times a day as needed for Cough. 60 Capsule 1    fluticasone (FLONASE) 50 MCG/ACT nasal spray Administer 1 Spray into affected nostril(S) every day. 16 g 1    lisinopril (PRINIVIL) 40 MG tablet Take 1 Tablet by mouth every day for 180 days. 90 Tablet 1    loratadine (CLARITIN) 10 MG Tab Take 1 Tablet by mouth every day. 90 Tablet 1    pantoprazole (PROTONIX) 40 MG Tablet Delayed Response Take 1 Tablet by mouth every day for 180 days. 90 Tablet 1    pioglitazone (ACTOS) 30 MG Tab Take 1 Tablet by mouth every day. 90 Tablet 1    Blood Glucose Test Strips Test strips order: Test strips for One Touch Verio meter. Sig: use 2x/day and PRN symptomatic 200 Strip 3    Ascorbic Acid (VITAMIN C) 1000 MG Tab Take  by mouth.      Multiple Vitamins-Minerals (OCUVITE-LUTEIN) Tab Take 1 Tablet by mouth every day.       No current facility-administered medications for this visit.       /86 (BP Location: Left arm, Patient Position: Sitting, BP Cuff Size: Adult)   Pulse 95   Temp 36.8 °C (98.3 °F) (Temporal)   Ht 1.676 m (5' 6\")   Wt 63.5 kg (140 lb)   SpO2 94%   BMI 22.60 kg/m²     PHYSICAL EXAM:  Physical Exam  Vitals and nursing note reviewed. Exam conducted with a chaperone present (, Yovani dao, present).   Constitutional:       General: She is not in acute distress.     Appearance: Normal appearance. She is normal weight. She is not ill-appearing.   HENT:      Head: Normocephalic and atraumatic.      Right Ear: External ear normal.      Left Ear: External ear normal.      Nose: Nose normal. No congestion or rhinorrhea.      " Mouth/Throat:      Mouth: Mucous membranes are moist.      Pharynx: Oropharynx is clear. No oropharyngeal exudate.   Eyes:      General:         Right eye: No discharge.         Left eye: No discharge.      Extraocular Movements: Extraocular movements intact.      Conjunctiva/sclera: Conjunctivae normal.      Pupils: Pupils are equal, round, and reactive to light.   Cardiovascular:      Rate and Rhythm: Normal rate and regular rhythm.      Pulses: Normal pulses.           Dorsalis pedis pulses are 2+ on the right side and 2+ on the left side.   Pulmonary:      Effort: Pulmonary effort is normal. No respiratory distress.      Breath sounds: Normal breath sounds. No wheezing or rales.   Abdominal:      General: Abdomen is flat. Bowel sounds are normal. There is no distension.      Palpations: Abdomen is soft.      Tenderness: There is no abdominal tenderness. There is no right CVA tenderness or left CVA tenderness.   Musculoskeletal:         General: No swelling, tenderness, deformity or signs of injury. Normal range of motion.      Cervical back: Normal range of motion and neck supple. No rigidity or tenderness.      Right lower leg: No edema.      Left lower leg: No edema.      Right foot: Normal range of motion. No deformity, bunion, Charcot foot or foot drop.      Left foot: Normal range of motion. No deformity, bunion, Charcot foot or foot drop.   Feet:      Right foot:      Protective Sensation: 10 sites tested.  10 sites sensed.      Skin integrity: Skin integrity normal. No ulcer, blister, skin breakdown or erythema.      Toenail Condition: Right toenails are normal.      Left foot:      Protective Sensation: 10 sites tested.  10 sites sensed.      Skin integrity: Skin integrity normal. No ulcer, blister, skin breakdown or erythema.      Toenail Condition: Left toenails are normal.   Skin:     General: Skin is warm and dry.      Capillary Refill: Capillary refill takes less than 2 seconds.      Findings: No  bruising.   Neurological:      Mental Status: She is alert and oriented to person, place, and time.      Cranial Nerves: No cranial nerve deficit or facial asymmetry.      Sensory: No sensory deficit.      Motor: Motor function is intact. No weakness or tremor.      Coordination: Coordination normal.      Gait: Gait normal.      Comments: Nonverbal   Psychiatric:         Mood and Affect: Mood normal.         Behavior: Behavior normal.         Thought Content: Thought content normal.         Judgment: Judgment normal.      Comments: Difficult to assess mood due to nonverbal status. However, through communication with writing, patient has normal mood and thought content, as well as judgment and behavior. She expressed understanding of plans via writing.     Monofilament testing with a 10 gram force: sensation intact: intact bilaterally  Visual Inspection: Feet without maceration, ulcers, fissures.  Pedal pulses: intact bilaterally      Assessment and Plan  1. Type 2 diabetes mellitus without complication, without long-term current use of insulin ([facility])  Well controlled, A1c 6.3% in 12/22. I have discussed with the patient that she does not need to check her BG levels regularly at home since it has been well controlled for years; however she prefers to check them.  Refilled Actos and atorvastatin.    -Referred to ophthalmology for retinal exam.    - POCT  A1C  - Referral to Ophthalmology  - Diabetic Monofilament Lower Extremity Exam  - atorvastatin (LIPITOR) 80 MG tablet; Take 1 Tablet by mouth every evening for 180 days.  Dispense: 90 Tablet; Refill: 1  - pioglitazone (ACTOS) 30 MG Tab; Take 1 Tablet by mouth every day.  Dispense: 90 Tablet; Refill: 1    2. Viral upper respiratory illness  Advised patient to take Allegra and Mucinex during the day, drink plenty of fluids and take Tessalon pearls during the night. Continue supportive care    - benzonatate (TESSALON) 100 MG Cap; Take 1 Capsule by mouth 2 times a day as  needed for Cough.  Dispense: 60 Capsule; Refill: 1    3. Seasonal allergies  Use flluticasone PRN for allergies (e.g. sagebrush)  - fluticasone (FLONASE) 50 MCG/ACT nasal spray; Administer 1 Spray into affected nostril(S) every day.  Dispense: 16 g; Refill: 2    4. Gastroesophageal reflux disease without esophagitis  Long-term issue. Will consider switching from pantoprazole to famotidine during subsequent visit.    - pantoprazole (PROTONIX) 40 MG Tablet Delayed Response; Take 1 Tablet by mouth every day for 180 days.  Dispense: 90 Tablet; Refill: 1    5. Primary hypertension  Refilled amlodipine. Will refill lisinopril as needed. Well controlled BP  - amLODIPine (NORVASC) 10 MG Tab; Take 1 Tablet by mouth every day.  Dispense: 90 Tablet; Refill: 1      Signed by: Andree Umaña M.D.    Follow up in 3 months for annual wellness visit.

## 2022-12-20 NOTE — PATIENT INSTRUCTIONS
"Hello! Today we saw you for:  -Diabetes  -I have referred you to an eye doctor to monitor your eyes (this is needed because diabetes can affect your eyes).  -For your upper viral respiratory infection: Take \"Mucinex\" during the daytime, and take \"benzotonate\" in the evening. Also take allegra.    Come back to see me in 3 months.  Thank you!  "